# Patient Record
Sex: MALE | Race: WHITE
[De-identification: names, ages, dates, MRNs, and addresses within clinical notes are randomized per-mention and may not be internally consistent; named-entity substitution may affect disease eponyms.]

---

## 2019-02-07 ENCOUNTER — HOSPITAL ENCOUNTER (EMERGENCY)
Dept: HOSPITAL 80 - FED | Age: 69
Discharge: HOME | End: 2019-02-07
Payer: COMMERCIAL

## 2019-02-07 VITALS — DIASTOLIC BLOOD PRESSURE: 82 MMHG | SYSTOLIC BLOOD PRESSURE: 117 MMHG

## 2019-02-07 DIAGNOSIS — Z79.01: ICD-10-CM

## 2019-02-07 DIAGNOSIS — I48.91: ICD-10-CM

## 2019-02-07 DIAGNOSIS — J20.5: Primary | ICD-10-CM

## 2019-02-07 PROCEDURE — 99284 EMERGENCY DEPT VISIT MOD MDM: CPT

## 2019-02-07 PROCEDURE — 71046 X-RAY EXAM CHEST 2 VIEWS: CPT

## 2019-02-07 NOTE — EDPHY
H & P


Time Seen by Provider: 02/07/19 13:26


HPI/ROS: 





CHIEF COMPLAINT:  Cough and shortness of breath





HISTORY OF PRESENT ILLNESS:  69-year-old man is on Eliquis for atrial 

fibrillation.  Recently returned from [place] week ago.  About 3 days ago 

started getting worsening cough with white phlegm and subjective fevers, 

associated with wheezing and shortness of breath.  Little bit worse with 

exertion.  Not associated with chest pain or hemoptysis or leg swelling.  No 

palpitations or syncope.  Associated with headache nausea and decreased intake.





REVIEW OF SYSTEMS:


Eye: no change in vision


ENT: no sore throat


Cardiac:  HPI


Pulmonary:  HPI


Abdomen: no vomiting, diarrhea, abdominal pain


Musculoskeletal: no back pain


Skin: no rash


Neuro: no headache


Constitutional:  HPI


: no urinary symptoms





A comprehensive 10 point review of systems is otherwise negative aside from 

elements mentioned in the history of present illness.





PAST MEDICAL HISTORY:  Includes atrial fibrillation on Eliquis, hypertension, 

high cholesterol, aortic aneurysm.  He has bicuspid aortic valve and 

hyperlipidemia.  CT scan personally reviewed from 9/26/2018 showed previous 

pulmonary fibrosis.





Social history:  Nonsmoker





General Appearance: Alert and conversant, cooperative.


Eyes: No scleral  icterus. 


ENT, Mouth: Normal mucous membranes.  No angioedema.


Respiratory:  Bilateral expiratory wheezing without rales or rhonchi.  Speaks 

in full sentences.  No accessory muscle use.


Cardiovascular:  Regular rate and rhythm.


Gastrointestinal:  Abdomen is soft and non tender.


Neurological: Alert, face symmetric, normal motor and sensory in extremities. 


Skin:  No urticaria.


Musculoskeletal: No peripheral edema.  No calf tenderness.


Psychiatric: Not agitated.





Emergency Department course/MDM:





Patient is concerned because his previous symptoms just like this in 2015 

resulted in a hospitalization for pneumonia.  He did get a flu test this year.  

He clearly has bronchospasm and an abnormal lung with pulmonary fibrosis at 

baseline.  Plan discussed to test him for influenza 1st and give DuoNeb and 

prednisone.  Steroids discussed and consented.  Depending on results of flu 

test will discharge with azithromycin or Tamiflu.  Patient states understanding 

and agreement with the plan.  I think he is clearly stable for outpatient 

treatment.





1502:  Influenza test negative.  RSV positive; patient concerned about 

development of bacterial super infection given his previous presentations in 

2015, I feel potential benefit from azithromycin likely outweighs risk at this 

time given underlying comorbidities present.


Smoking Status: Never smoked


Constitutional: 


 Initial Vital Signs











Temperature (C)  36.7 C   02/07/19 13:10


 


Heart Rate  58 L  02/07/19 13:10


 


Respiratory Rate  16   02/07/19 13:10


 


Blood Pressure  143/88 H  02/07/19 13:10


 


O2 Sat (%)  96   02/07/19 13:10








 











O2 Delivery Mode               Room Air














Allergies/Adverse Reactions: 


 





No Known Allergies Allergy (Unverified 02/07/19 13:08)


 








Home Medications: 














 Medication  Instructions  Recorded


 


Benazepril HCl [Lotensin (*)] 20 mg PO BID 02/23/15


 


Cholecalciferol Vit D3 [Vitamin D3 2,000 units PO BID 02/23/15





(*)]  


 


Nebivolol HCl [Bystolic 5 mg (*)] 10 mg PO DAILY 02/23/15


 


Omega-3 Fatty Acids [Fish Oil 1000 4,000 mg PO DAILY 02/23/15





mg (*)]  


 


Rosuvastatin Calcium [Crestor 40mg 40 mg PO DAILY@1800 02/23/15





(*)]  


 


amLODIPine BESYLATE [Norvasc 5 mg 5 mg PO BID 02/23/15





(*)]  


 


Albuterol Hfa Anes Only [Proair 2 puffs IH QID 1 Days  mdi 02/07/19





Hfa Icu (*)]  


 


Azithromycin 250 mg PO AD #6 tablet 02/07/19


 


Eliquis  02/07/19


 


Levothyroxine  02/07/19


 


predniSONE [prednisone 20mg (RX)] 20 mg PO Q12 #10 tab 02/07/19














Medical Decision Making





- Diagnostics


Imaging Results: 


 Imaging Impressions





Chest X-Ray  02/07/19 13:20


Impression: 1. Possibly worsening interstitial lung disease. Consider 

noncontrast high-resolution chest CT, which could then be compared to prior CT 

angiography.


2. No evidence for superimposed pneumonia or other lung consolidation.


 











Imaging: I viewed and interpreted images myself





- Data Points


Laboratory Results: 


 











  02/07/19





  13:50


 


Nasal Influenza A PCR  NEGATIVE FOR FLU A 





   (NEGATIVE) 


 


Nasal Influenza B PCR  NEGATIVE FOR FLU B 





   (NEGATIVE) 


 


RSV (PCR)  RSV DETECTED  H 





   (NEGATIVE) 











Medications Given: 


 








Discontinued Medications





Albuterol (Proventil Neb)  3 ml IH EDNOW ONE


   Stop: 02/07/19 13:52


   Last Admin: 02/07/19 14:04 Dose:  3 ml


Albuterol/Ipratropium (Duoneb)  3 ml IH EDNOW ONE


   Stop: 02/07/19 13:52


   Last Admin: 02/07/19 14:04 Dose:  3 ml


Prednisone (Prednisone)  60 mg PO EDNOW ONE


   Stop: 02/07/19 13:52


   Last Admin: 02/07/19 14:03 Dose:  60 mg








Departure





- Departure


Disposition: Home, Routine, Self-Care


Clinical Impression: 


Acute bronchitis


Qualifiers:


 Bronchitis organism: RSV Qualified Code(s): J20.5 - Acute bronchitis due to 

respiratory syncytial virus





Condition: Good


Instructions:  Acute Bronchitis (ED)


Referrals: 


Sameer Bustillo MD [Primary Care Provider] - As per Instructions


Prescriptions: 


Albuterol Hfa Anes Only [Proair Hfa Icu (*)] 2 puffs IH QID 1 Days  mdi


Azithromycin 250 mg PO AD #6 tablet


predniSONE [prednisone 20mg (RX)] 20 mg PO Q12 #10 tab

## 2019-04-15 ENCOUNTER — HOSPITAL ENCOUNTER (OUTPATIENT)
Dept: HOSPITAL 80 - FCPNEURO | Age: 69
End: 2019-04-15
Attending: PSYCHIATRY & NEUROLOGY
Payer: COMMERCIAL

## 2019-04-15 DIAGNOSIS — G47.33: Primary | ICD-10-CM
